# Patient Record
Sex: FEMALE | Race: WHITE | NOT HISPANIC OR LATINO | URBAN - METROPOLITAN AREA
[De-identification: names, ages, dates, MRNs, and addresses within clinical notes are randomized per-mention and may not be internally consistent; named-entity substitution may affect disease eponyms.]

---

## 2020-08-13 ENCOUNTER — OFFICE VISIT (OUTPATIENT)
Dept: URBAN - METROPOLITAN AREA CLINIC 74 | Facility: CLINIC | Age: 50
End: 2020-08-13

## 2020-08-31 ENCOUNTER — LAB OUTSIDE AN ENCOUNTER (OUTPATIENT)
Dept: URBAN - METROPOLITAN AREA CLINIC 74 | Facility: CLINIC | Age: 50
End: 2020-08-31

## 2020-08-31 ENCOUNTER — OFFICE VISIT (OUTPATIENT)
Dept: URBAN - METROPOLITAN AREA CLINIC 74 | Facility: CLINIC | Age: 50
End: 2020-08-31
Payer: COMMERCIAL

## 2020-08-31 DIAGNOSIS — R13.10 ESOPHAGEAL DYSPHAGIA: ICD-10-CM

## 2020-08-31 DIAGNOSIS — Z12.11 ENCOUNTER FOR SCREENING FOR MALIGNANT NEOPLASM OF COLON: ICD-10-CM

## 2020-08-31 DIAGNOSIS — Z12.12 ENCOUNTER FOR SCREENING FOR MALIGNANT NEOPLASM OF RECTUM: ICD-10-CM

## 2020-08-31 DIAGNOSIS — Z91.89 OTHER SPECIFIED PERSONAL RISK FACTORS, NOT ELSEWHERE CLASSIFIED: ICD-10-CM

## 2020-08-31 PROBLEM — 305058001: Status: ACTIVE | Noted: 2020-08-31

## 2020-08-31 PROBLEM — 40890009: Status: ACTIVE | Noted: 2020-08-31

## 2020-08-31 PROBLEM — 281694009: Status: ACTIVE | Noted: 2020-08-31

## 2020-08-31 PROCEDURE — 99203 OFFICE O/P NEW LOW 30 MIN: CPT | Performed by: STUDENT IN AN ORGANIZED HEALTH CARE EDUCATION/TRAINING PROGRAM

## 2020-08-31 PROCEDURE — G8427 DOCREV CUR MEDS BY ELIG CLIN: HCPCS | Performed by: STUDENT IN AN ORGANIZED HEALTH CARE EDUCATION/TRAINING PROGRAM

## 2020-08-31 PROCEDURE — 1036F TOBACCO NON-USER: CPT | Performed by: STUDENT IN AN ORGANIZED HEALTH CARE EDUCATION/TRAINING PROGRAM

## 2020-08-31 PROCEDURE — G8417 CALC BMI ABV UP PARAM F/U: HCPCS | Performed by: STUDENT IN AN ORGANIZED HEALTH CARE EDUCATION/TRAINING PROGRAM

## 2020-08-31 RX ORDER — CALCIUM CARBONATE 500 MG/1
1 TABLET TABLET ORAL ONCE A DAY
Status: ACTIVE | COMMUNITY

## 2020-08-31 RX ORDER — FOLIC ACID/MULTIVIT,IRON,MINER .4-18-35
AS DIRECTED TABLET,CHEWABLE ORAL
Status: ACTIVE | COMMUNITY

## 2020-08-31 RX ORDER — FLUTICASONE PROPIONATE AND SALMETEROL 250; 50 UG/1; UG/1
1 PUFF POWDER RESPIRATORY (INHALATION) TWICE A DAY
Status: ACTIVE | COMMUNITY

## 2020-08-31 RX ORDER — ASCORBIC ACID/VITAMIN E/BIOTIN 7.5MG-125
AS DIRECTED TABLET,CHEWABLE ORAL
Status: ACTIVE | COMMUNITY

## 2020-08-31 RX ORDER — SODIUM, POTASSIUM,MAG SULFATES 17.5-3.13G
354ML SOLUTION, RECONSTITUTED, ORAL ORAL
Qty: 354 MILLILITER | Refills: 0 | OUTPATIENT
Start: 2020-08-31

## 2020-08-31 NOTE — HPI-TODAY'S VISIT:
50-year-old female Past medical and surgical history reviewed. Past medical history notable for asthma which is well controlled. Patient comes in for evaluation of worsening heartburn with intermittent dysphagia and odynophagia over last few months. Patient says that her heartburn has always been an issue but used to respond very well to Tums but never did responded to PPIs.  But in the last few months it has been much more frequent with lack of response even to Tums.  The symptoms happen every day and even at night after her meals.  In addition patient has been experiencing feeling of food getting stuck in the middle of her food pipe, mostly with meats and breads, improves with intake of liquids.  Never regurgitates.  Does feel discomfort or pain at the site when the episode happens.  Self resolves in 15 to 20 minutes.  This has been happening about 1-2 times every week in the last few months.  After patient overall the frequency and intensity of the symptoms has worsened with. Patient denies any abdominal pain. Denies any constipation or diarrhea.  No blood in the stool. Denies any NSAID use. Not on any anticoagulants.  No aspirin use. Significant family history of colorectal cancer stage IV diagnosed in brother, first-degree relative at age 53 years. No prior endoscopy or colonoscopy ever No weight loss.

## 2020-09-10 ENCOUNTER — TELEPHONE ENCOUNTER (OUTPATIENT)
Dept: URBAN - METROPOLITAN AREA CLINIC 74 | Facility: CLINIC | Age: 50
End: 2020-09-10

## 2020-09-10 ENCOUNTER — OFFICE VISIT (OUTPATIENT)
Dept: URBAN - METROPOLITAN AREA SURGERY CENTER 30 | Facility: SURGERY CENTER | Age: 50
End: 2020-09-10
Payer: COMMERCIAL

## 2020-09-10 DIAGNOSIS — K22.2 ESOPHAGEAL OBSTRUCTION: ICD-10-CM

## 2020-09-10 DIAGNOSIS — K21.0 BILE REFLUX ESOPHAGITIS: ICD-10-CM

## 2020-09-10 PROCEDURE — G8907 PT DOC NO EVENTS ON DISCHARG: HCPCS | Performed by: STUDENT IN AN ORGANIZED HEALTH CARE EDUCATION/TRAINING PROGRAM

## 2020-09-10 PROCEDURE — 43235 EGD DIAGNOSTIC BRUSH WASH: CPT | Performed by: STUDENT IN AN ORGANIZED HEALTH CARE EDUCATION/TRAINING PROGRAM

## 2020-09-10 RX ORDER — ASCORBIC ACID/VITAMIN E/BIOTIN 7.5MG-125
AS DIRECTED TABLET,CHEWABLE ORAL
Status: ACTIVE | COMMUNITY

## 2020-09-10 RX ORDER — CALCIUM CARBONATE 500 MG/1
1 TABLET TABLET ORAL ONCE A DAY
Status: ACTIVE | COMMUNITY

## 2020-09-10 RX ORDER — FLUTICASONE PROPIONATE AND SALMETEROL 250; 50 UG/1; UG/1
1 PUFF POWDER RESPIRATORY (INHALATION) TWICE A DAY
Status: ACTIVE | COMMUNITY

## 2020-09-10 RX ORDER — FOLIC ACID/MULTIVIT,IRON,MINER .4-18-35
AS DIRECTED TABLET,CHEWABLE ORAL
Status: ACTIVE | COMMUNITY

## 2020-09-10 RX ORDER — SODIUM, POTASSIUM,MAG SULFATES 17.5-3.13G
354ML SOLUTION, RECONSTITUTED, ORAL ORAL
Qty: 354 MILLILITER | Refills: 0 | Status: ACTIVE | COMMUNITY
Start: 2020-08-31

## 2020-09-10 RX ORDER — PANTOPRAZOLE SODIUM 40 MG/1
1 TABLET TABLET, DELAYED RELEASE ORAL BID
Qty: 60 | Refills: 2 | OUTPATIENT
Start: 2020-09-10

## 2020-09-17 ENCOUNTER — OFFICE VISIT (OUTPATIENT)
Dept: URBAN - METROPOLITAN AREA SURGERY CENTER 30 | Facility: SURGERY CENTER | Age: 50
End: 2020-09-17
Payer: COMMERCIAL

## 2020-09-17 DIAGNOSIS — Z80.0 FAMILY HISTORY MALIGNANT NEOPLASM OF BILIARY TRACT: ICD-10-CM

## 2020-09-17 DIAGNOSIS — Z12.11 COLON CANCER SCREENING: ICD-10-CM

## 2020-09-17 DIAGNOSIS — K63.89 BACTERIAL OVERGROWTH SYNDROME: ICD-10-CM

## 2020-09-17 DIAGNOSIS — D12.2 ADENOMA OF ASCENDING COLON: ICD-10-CM

## 2020-09-17 PROCEDURE — 45385 COLONOSCOPY W/LESION REMOVAL: CPT | Performed by: STUDENT IN AN ORGANIZED HEALTH CARE EDUCATION/TRAINING PROGRAM

## 2020-09-17 PROCEDURE — G8907 PT DOC NO EVENTS ON DISCHARG: HCPCS | Performed by: STUDENT IN AN ORGANIZED HEALTH CARE EDUCATION/TRAINING PROGRAM

## 2020-09-17 PROCEDURE — G9933 CANC DETECTD DURING COL SCRN: HCPCS | Performed by: STUDENT IN AN ORGANIZED HEALTH CARE EDUCATION/TRAINING PROGRAM

## 2020-09-28 ENCOUNTER — OFFICE VISIT (OUTPATIENT)
Dept: URBAN - METROPOLITAN AREA CLINIC 74 | Facility: CLINIC | Age: 50
End: 2020-09-28

## 2020-09-29 ENCOUNTER — OFFICE VISIT (OUTPATIENT)
Dept: URBAN - METROPOLITAN AREA CLINIC 74 | Facility: CLINIC | Age: 50
End: 2020-09-29
Payer: COMMERCIAL

## 2020-09-29 DIAGNOSIS — D12.6 TUBULAR ADENOMA OF COLON: ICD-10-CM

## 2020-09-29 DIAGNOSIS — K20.8 LOS ANGELES GRADE C ESOPHAGITIS: ICD-10-CM

## 2020-09-29 PROCEDURE — G8417 CALC BMI ABV UP PARAM F/U: HCPCS | Performed by: STUDENT IN AN ORGANIZED HEALTH CARE EDUCATION/TRAINING PROGRAM

## 2020-09-29 PROCEDURE — 99213 OFFICE O/P EST LOW 20 MIN: CPT | Performed by: STUDENT IN AN ORGANIZED HEALTH CARE EDUCATION/TRAINING PROGRAM

## 2020-09-29 PROCEDURE — G8427 DOCREV CUR MEDS BY ELIG CLIN: HCPCS | Performed by: STUDENT IN AN ORGANIZED HEALTH CARE EDUCATION/TRAINING PROGRAM

## 2020-09-29 PROCEDURE — 1036F TOBACCO NON-USER: CPT | Performed by: STUDENT IN AN ORGANIZED HEALTH CARE EDUCATION/TRAINING PROGRAM

## 2020-09-29 RX ORDER — PANTOPRAZOLE SODIUM 40 MG/1
1 TABLET TABLET, DELAYED RELEASE ORAL BID
Qty: 60 | Refills: 2 | Status: ACTIVE | COMMUNITY
Start: 2020-09-10

## 2020-09-29 RX ORDER — FOLIC ACID/MULTIVIT,IRON,MINER .4-18-35
AS DIRECTED TABLET,CHEWABLE ORAL
Status: ACTIVE | COMMUNITY

## 2020-09-29 RX ORDER — CALCIUM CARBONATE 500 MG/1
1 TABLET TABLET ORAL ONCE A DAY
Status: ACTIVE | COMMUNITY

## 2020-09-29 RX ORDER — FLUTICASONE PROPIONATE AND SALMETEROL 250; 50 UG/1; UG/1
1 PUFF POWDER RESPIRATORY (INHALATION) TWICE A DAY
Status: ACTIVE | COMMUNITY

## 2020-09-29 RX ORDER — SODIUM, POTASSIUM,MAG SULFATES 17.5-3.13G
354ML SOLUTION, RECONSTITUTED, ORAL ORAL
Qty: 354 MILLILITER | Refills: 0 | Status: DISCONTINUED | COMMUNITY
Start: 2020-08-31

## 2020-09-29 RX ORDER — ASCORBIC ACID/VITAMIN E/BIOTIN 7.5MG-125
AS DIRECTED TABLET,CHEWABLE ORAL
Status: ACTIVE | COMMUNITY

## 2020-09-29 NOTE — HPI-TODAY'S VISIT:
50-year-old female Past medical and surgical history reviewed. Patient comes in for a follow-up visit after her procedures. EGD was notable for grade C esophagitis.  Mild narrowing with Schatzki's ring at GE junction noted.  Not dilated.  Small hiatal hernia. Colonoscopy was notable for one 7 mm polyp status post cold snare resection.  Pathology notable for tubular adenoma.  Otherwise colon diverticulosis noted. Postprocedure patient was started on pantoprazole 40 mg twice daily.  Patient has been compliant with it. Patient today denies any GI specific complaints.  No dysphagia.  No heartburn episodes.  No nausea or vomiting.  No regurgitation.  No abdominal pain.  No change in bowel habits.  No diarrhea or constipation.  No blood in stool.

## 2020-10-02 ENCOUNTER — OFFICE VISIT (OUTPATIENT)
Dept: URBAN - METROPOLITAN AREA CLINIC 74 | Facility: CLINIC | Age: 50
End: 2020-10-02

## 2020-11-19 ENCOUNTER — OFFICE VISIT (OUTPATIENT)
Dept: URBAN - METROPOLITAN AREA SURGERY CENTER 30 | Facility: SURGERY CENTER | Age: 50
End: 2020-11-19
Payer: COMMERCIAL

## 2020-11-19 DIAGNOSIS — K22.8 COLUMNAR-LINED ESOPHAGUS: ICD-10-CM

## 2020-11-19 DIAGNOSIS — R13.10 ABNORMAL SWALLOWING: ICD-10-CM

## 2020-11-19 PROCEDURE — G8907 PT DOC NO EVENTS ON DISCHARG: HCPCS | Performed by: STUDENT IN AN ORGANIZED HEALTH CARE EDUCATION/TRAINING PROGRAM

## 2020-11-19 PROCEDURE — 43235 EGD DIAGNOSTIC BRUSH WASH: CPT | Performed by: STUDENT IN AN ORGANIZED HEALTH CARE EDUCATION/TRAINING PROGRAM

## 2020-12-04 ENCOUNTER — OFFICE VISIT (OUTPATIENT)
Dept: URBAN - METROPOLITAN AREA CLINIC 74 | Facility: CLINIC | Age: 50
End: 2020-12-04
Payer: COMMERCIAL

## 2020-12-04 ENCOUNTER — WEB ENCOUNTER (OUTPATIENT)
Dept: URBAN - METROPOLITAN AREA CLINIC 74 | Facility: CLINIC | Age: 50
End: 2020-12-04

## 2020-12-04 DIAGNOSIS — K20.80 LOS ANGELES GRADE C ESOPHAGITIS: ICD-10-CM

## 2020-12-04 DIAGNOSIS — D12.6 TUBULAR ADENOMA OF COLON: ICD-10-CM

## 2020-12-04 PROCEDURE — G9903 PT SCRN TBCO ID AS NON USER: HCPCS | Performed by: STUDENT IN AN ORGANIZED HEALTH CARE EDUCATION/TRAINING PROGRAM

## 2020-12-04 PROCEDURE — G8417 CALC BMI ABV UP PARAM F/U: HCPCS | Performed by: STUDENT IN AN ORGANIZED HEALTH CARE EDUCATION/TRAINING PROGRAM

## 2020-12-04 PROCEDURE — 99213 OFFICE O/P EST LOW 20 MIN: CPT | Performed by: STUDENT IN AN ORGANIZED HEALTH CARE EDUCATION/TRAINING PROGRAM

## 2020-12-04 PROCEDURE — 1036F TOBACCO NON-USER: CPT | Performed by: STUDENT IN AN ORGANIZED HEALTH CARE EDUCATION/TRAINING PROGRAM

## 2020-12-04 PROCEDURE — G8484 FLU IMMUNIZE NO ADMIN: HCPCS | Performed by: STUDENT IN AN ORGANIZED HEALTH CARE EDUCATION/TRAINING PROGRAM

## 2020-12-04 PROCEDURE — G8427 DOCREV CUR MEDS BY ELIG CLIN: HCPCS | Performed by: STUDENT IN AN ORGANIZED HEALTH CARE EDUCATION/TRAINING PROGRAM

## 2020-12-04 RX ORDER — FLUTICASONE PROPIONATE AND SALMETEROL 250; 50 UG/1; UG/1
1 PUFF POWDER RESPIRATORY (INHALATION) TWICE A DAY
Status: ACTIVE | COMMUNITY

## 2020-12-04 RX ORDER — ASCORBIC ACID/VITAMIN E/BIOTIN 7.5MG-125
AS DIRECTED TABLET,CHEWABLE ORAL
Status: ACTIVE | COMMUNITY

## 2020-12-04 RX ORDER — FOLIC ACID/MULTIVIT,IRON,MINER .4-18-35
AS DIRECTED TABLET,CHEWABLE ORAL
Status: ACTIVE | COMMUNITY

## 2020-12-04 RX ORDER — CALCIUM CARBONATE 500 MG/1
1 TABLET TABLET ORAL ONCE A DAY
Status: ACTIVE | COMMUNITY

## 2020-12-04 RX ORDER — PANTOPRAZOLE SODIUM 40 MG/1
1 TABLET TABLET, DELAYED RELEASE ORAL BID
Qty: 60 | Refills: 2 | Status: ACTIVE | COMMUNITY
Start: 2020-09-10

## 2020-12-04 NOTE — HPI-TODAY'S VISIT:
50-year-old female Established patient with me Initially underwent EGD and colonoscopy. EGD was notable for grade C esophagitis.  Patient was started on twice daily pantoprazole 40 mg.  Patient had a repeat EGD 3 months later which confirmed complete healing of esophagitis.  No precancerous lesions or Winkler's disease.  Currently patient is on 40 mg pantoprazole every day. Colonoscopy was notable for colon tubular adenoma status post complete removal with cold snare. Patient currently denies any GI specific symptoms.  No dysphagia, odynophagia, heartburn, abdominal pain, nausea, vomiting, weight loss, change in bowel habits, constipation, diarrhea or blood in stool.

## 2021-01-08 ENCOUNTER — ERX REFILL RESPONSE (OUTPATIENT)
Dept: URBAN - METROPOLITAN AREA CLINIC 74 | Facility: CLINIC | Age: 51
End: 2021-01-08

## 2021-01-08 RX ORDER — PANTOPRAZOLE SODIUM 40 MG/1
1 TABLET TABLET, DELAYED RELEASE ORAL BID
Qty: 60 | Refills: 0

## 2021-02-11 ENCOUNTER — ERX REFILL RESPONSE (OUTPATIENT)
Dept: URBAN - METROPOLITAN AREA CLINIC 74 | Facility: CLINIC | Age: 51
End: 2021-02-11

## 2021-02-11 RX ORDER — PANTOPRAZOLE SODIUM 40 MG/1
1 TABLET TABLET, DELAYED RELEASE ORAL BID
Qty: 60 | Refills: 0

## 2021-05-05 ENCOUNTER — TELEPHONE ENCOUNTER (OUTPATIENT)
Dept: URBAN - METROPOLITAN AREA CLINIC 23 | Facility: CLINIC | Age: 51
End: 2021-05-05

## 2021-05-05 RX ORDER — PANTOPRAZOLE SODIUM 40 MG/1
1 TABLET TABLET, DELAYED RELEASE ORAL ONCE A DAY
Qty: 90 TABLET | Refills: 3

## 2021-05-20 ENCOUNTER — ERX REFILL RESPONSE (OUTPATIENT)
Dept: URBAN - METROPOLITAN AREA CLINIC 74 | Facility: CLINIC | Age: 51
End: 2021-05-20

## 2021-05-20 RX ORDER — PANTOPRAZOLE SODIUM 40 MG/1
1 TABLET TABLET, DELAYED RELEASE ORAL BID
Qty: 60 | Refills: 0

## 2021-10-20 NOTE — PHYSICAL EXAM HENT:
Head, normocephalic, atraumatic, Face, Face within normal limits, Ears, External ears within normal limits, Nose/Nasopharynx, External nose  normal appearance, nares patent, no nasal discharge, Mouth and Throat, Oral cavity appearance normal, Breath odor normal, Lips, Appearance normal
[Symptom and Test Evaluation] : symptom and test evaluation

## 2022-04-25 ENCOUNTER — TELEPHONE ENCOUNTER (OUTPATIENT)
Dept: URBAN - METROPOLITAN AREA CLINIC 74 | Facility: CLINIC | Age: 52
End: 2022-04-25

## 2022-04-25 ENCOUNTER — ERX REFILL RESPONSE (OUTPATIENT)
Dept: URBAN - METROPOLITAN AREA CLINIC 40 | Facility: CLINIC | Age: 52
End: 2022-04-25

## 2022-04-25 RX ORDER — PANTOPRAZOLE SODIUM 40 MG/1
TAKE 1 TABLET BY MOUTH EVERY DAY TABLET, DELAYED RELEASE ORAL
Qty: 90 TABLET | Refills: 1 | OUTPATIENT

## 2022-04-25 RX ORDER — PANTOPRAZOLE SODIUM 40 MG/1
TAKE 1 TABLET BY MOUTH EVERY DAY TABLET, DELAYED RELEASE ORAL
Qty: 90 TABLET | Refills: 0 | OUTPATIENT

## 2022-04-25 RX ORDER — PANTOPRAZOLE SODIUM 40 MG/1
1 TABLET TABLET, DELAYED RELEASE ORAL ONCE A DAY
Qty: 90 | Refills: 3

## 2022-08-29 PROBLEM — 196735001: Status: ACTIVE | Noted: 2022-08-29

## 2022-09-01 ENCOUNTER — OFFICE VISIT (OUTPATIENT)
Dept: URBAN - METROPOLITAN AREA CLINIC 74 | Facility: CLINIC | Age: 52
End: 2022-09-01

## 2022-09-01 RX ORDER — CALCIUM CARBONATE 500 MG/1
1 TABLET TABLET ORAL ONCE A DAY
Status: ACTIVE | COMMUNITY

## 2022-09-01 RX ORDER — PANTOPRAZOLE SODIUM 40 MG/1
1 TABLET TABLET, DELAYED RELEASE ORAL ONCE A DAY
Qty: 90 | Refills: 3 | Status: ACTIVE | COMMUNITY

## 2022-09-01 RX ORDER — ASCORBIC ACID/VITAMIN E/BIOTIN 7.5MG-125
AS DIRECTED TABLET,CHEWABLE ORAL
Status: ACTIVE | COMMUNITY

## 2022-09-01 RX ORDER — FOLIC ACID/MULTIVIT,IRON,MINER .4-18-35
AS DIRECTED TABLET,CHEWABLE ORAL
Status: ACTIVE | COMMUNITY

## 2022-09-01 RX ORDER — FLUTICASONE PROPIONATE AND SALMETEROL 250; 50 UG/1; UG/1
1 PUFF POWDER RESPIRATORY (INHALATION) TWICE A DAY
Status: ACTIVE | COMMUNITY

## 2022-09-01 NOTE — HPI-TODAY'S VISIT:
The patient is 52-year-old female well-known to Dr. Bailey and Dr. Gilmore is here today for reflux symptoms.   Procedures: -- EGD with biopsy on 11/19/2020 by Dr. Bailey noted Z-line irregular, at the gastro esophageal junction.  There is no endoscopy evidence of Winkler's esophagus, areas of erosion, esophagitis, mucosal abnormalities, salmon-colored mucosa, stenosis or stricture in the entire esophagus.  Normal stomach.  Normal examined duodenum.  No specimen collected.    -- Colonoscopy with polypectomy on 09/17/2020 by Dr. Bailey noted one 7 mm polyp was found in the proximal ascending colon resected and retrieved.  One  3 mm polyp was found in the recto-sigmoid colon.  Polypectomy was not attempted due to hyperplastic appearance.  Diverticulosis in the ascending colon.  The examination was otherwise normal on direct and retroflexion views.  Biopsy with tubular adenoma colon polyp.

## 2022-09-20 ENCOUNTER — OFFICE VISIT (OUTPATIENT)
Dept: URBAN - METROPOLITAN AREA CLINIC 40 | Facility: CLINIC | Age: 52
End: 2022-09-20

## 2022-09-20 RX ORDER — FOLIC ACID/MULTIVIT,IRON,MINER .4-18-35
AS DIRECTED TABLET,CHEWABLE ORAL
Status: ACTIVE | COMMUNITY

## 2022-09-20 RX ORDER — ASCORBIC ACID/VITAMIN E/BIOTIN 7.5MG-125
AS DIRECTED TABLET,CHEWABLE ORAL
Status: ACTIVE | COMMUNITY

## 2022-09-20 RX ORDER — PANTOPRAZOLE SODIUM 40 MG/1
1 TABLET TABLET, DELAYED RELEASE ORAL ONCE A DAY
Qty: 90 | Refills: 3 | Status: ACTIVE | COMMUNITY

## 2022-09-20 RX ORDER — FLUTICASONE PROPIONATE AND SALMETEROL 250; 50 UG/1; UG/1
1 PUFF POWDER RESPIRATORY (INHALATION) TWICE A DAY
Status: ACTIVE | COMMUNITY

## 2022-09-20 RX ORDER — CALCIUM CARBONATE 500 MG/1
1 TABLET TABLET ORAL ONCE A DAY
Status: ACTIVE | COMMUNITY

## 2022-09-20 NOTE — PHYSICAL EXAM NECK/THYROID:
normal appearance , without tenderness upon palpation , no deformities , trachea midline , Thyroid normal size , no thyroid nodules , no masses , no JVD , thyroid nontender Good Good Good

## 2022-10-20 ENCOUNTER — OFFICE VISIT (OUTPATIENT)
Dept: URBAN - METROPOLITAN AREA CLINIC 40 | Facility: CLINIC | Age: 52
End: 2022-10-20
Payer: COMMERCIAL

## 2022-10-20 VITALS
HEIGHT: 64 IN | BODY MASS INDEX: 34.15 KG/M2 | TEMPERATURE: 98.1 F | HEART RATE: 67 BPM | SYSTOLIC BLOOD PRESSURE: 128 MMHG | WEIGHT: 200 LBS | DIASTOLIC BLOOD PRESSURE: 90 MMHG

## 2022-10-20 DIAGNOSIS — D12.6 TUBULAR ADENOMA OF COLON: ICD-10-CM

## 2022-10-20 DIAGNOSIS — K29.70 GASTRITIS: ICD-10-CM

## 2022-10-20 DIAGNOSIS — K20.8 LOS ANGELES GRADE C ESOPHAGITIS: ICD-10-CM

## 2022-10-20 PROBLEM — 4556007 GASTRITIS: Status: ACTIVE | Noted: 2022-09-20

## 2022-10-20 PROBLEM — 16761005: Status: ACTIVE | Noted: 2020-09-10

## 2022-10-20 PROBLEM — 444898006: Status: ACTIVE | Noted: 2020-09-29

## 2022-10-20 PROCEDURE — 99213 OFFICE O/P EST LOW 20 MIN: CPT | Performed by: PHYSICIAN ASSISTANT

## 2022-10-20 RX ORDER — CALCIUM CARBONATE 500 MG/1
1 TABLET TABLET ORAL ONCE A DAY
Status: ACTIVE | COMMUNITY

## 2022-10-20 RX ORDER — FOLIC ACID/MULTIVIT,IRON,MINER .4-18-35
AS DIRECTED TABLET,CHEWABLE ORAL
Status: ACTIVE | COMMUNITY

## 2022-10-20 RX ORDER — ASCORBIC ACID/VITAMIN E/BIOTIN 7.5MG-125
AS DIRECTED TABLET,CHEWABLE ORAL
Status: ACTIVE | COMMUNITY

## 2022-10-20 RX ORDER — PANTOPRAZOLE SODIUM 40 MG/1
1 TABLET TABLET, DELAYED RELEASE ORAL ONCE A DAY
Qty: 90 | Refills: 3 | Status: ACTIVE | COMMUNITY

## 2022-10-20 RX ORDER — PANTOPRAZOLE SODIUM 40 MG/1
1 TABLET TABLET, DELAYED RELEASE ORAL ONCE A DAY
Qty: 90 | Refills: 3

## 2022-10-20 RX ORDER — FLUTICASONE PROPIONATE AND SALMETEROL 250; 50 UG/1; UG/1
1 PUFF POWDER RESPIRATORY (INHALATION) TWICE A DAY
Status: ACTIVE | COMMUNITY

## 2022-10-20 NOTE — HPI-TODAY'S VISIT:
Ms. Mercedes is a 52-year-old White female well-known to Dr. Bailey and Dr. Gilmore is here today for reflux symptoms. Last f/u 12/4/20.   Procedures: -- EGD with biopsy on 11/19/2020 by Dr. Bailey noted Z-line irregular, at the gastro esophageal junction.  There is no endoscopy evidence of Winkler's esophagus, areas of erosion, esophagitis, mucosal abnormalities, salmon-colored mucosa, stenosis or stricture in the entire esophagus.  Normal stomach.  Normal examined duodenum.  No specimen collected.    -- Colonoscopy with polypectomy on 09/17/2020 by Dr. Bailey noted one 7 mm polyp was found in the proximal ascending colon resected and retrieved.  One  3 mm polyp was found in the recto-sigmoid colon.  Polypectomy was not attempted due to hyperplastic appearance.  Diverticulosis in the ascending colon.  The examination was otherwise normal on direct and retroflexion views.  Biopsy with tubular adenoma colon polyp. She returns to the office requesting refill of pantoprazole. Down from two a day to one a day. Denies new GI complaints. She is monitoring diet, trying to exercise some for weight loss. No regular alcohol consumption. Nonsmoker with history of asthma. Admits recent use of Aleve in the last 2-3 days for LE, foot pain. Uses meloxicam rarely. No GI bleeding reported.

## 2023-06-08 ENCOUNTER — TELEPHONE ENCOUNTER (OUTPATIENT)
Dept: URBAN - METROPOLITAN AREA CLINIC 40 | Facility: CLINIC | Age: 53
End: 2023-06-08

## 2023-06-08 RX ORDER — PANTOPRAZOLE SODIUM 40 MG/1
1 TABLET TABLET, DELAYED RELEASE ORAL ONCE A DAY
Qty: 90 | Refills: 0

## 2023-10-20 ENCOUNTER — OFFICE VISIT (OUTPATIENT)
Dept: URBAN - METROPOLITAN AREA CLINIC 40 | Facility: CLINIC | Age: 53
End: 2023-10-20

## 2023-10-20 RX ORDER — CALCIUM CARBONATE 500 MG/1
1 TABLET TABLET ORAL ONCE A DAY
Status: ACTIVE | COMMUNITY

## 2023-10-20 RX ORDER — ASCORBIC ACID/VITAMIN E/BIOTIN 7.5MG-125
AS DIRECTED TABLET,CHEWABLE ORAL
Status: ACTIVE | COMMUNITY

## 2023-10-20 RX ORDER — FOLIC ACID/MULTIVIT,IRON,MINER .4-18-35
AS DIRECTED TABLET,CHEWABLE ORAL
Status: ACTIVE | COMMUNITY

## 2023-10-20 RX ORDER — FLUTICASONE PROPIONATE AND SALMETEROL 250; 50 UG/1; UG/1
1 PUFF POWDER RESPIRATORY (INHALATION) TWICE A DAY
Status: ACTIVE | COMMUNITY

## 2023-10-20 RX ORDER — PANTOPRAZOLE SODIUM 40 MG/1
1 TABLET TABLET, DELAYED RELEASE ORAL ONCE A DAY
Qty: 90 | Refills: 0 | Status: ACTIVE | COMMUNITY

## 2023-10-31 ENCOUNTER — DASHBOARD ENCOUNTERS (OUTPATIENT)
Age: 53
End: 2023-10-31

## 2023-10-31 ENCOUNTER — OFFICE VISIT (OUTPATIENT)
Dept: URBAN - METROPOLITAN AREA CLINIC 40 | Facility: CLINIC | Age: 53
End: 2023-10-31
Payer: COMMERCIAL

## 2023-10-31 VITALS
WEIGHT: 126 LBS | BODY MASS INDEX: 21.51 KG/M2 | SYSTOLIC BLOOD PRESSURE: 110 MMHG | DIASTOLIC BLOOD PRESSURE: 64 MMHG | HEIGHT: 64 IN | TEMPERATURE: 96.1 F | HEART RATE: 68 BPM

## 2023-10-31 DIAGNOSIS — D12.6 TUBULAR ADENOMA OF COLON: ICD-10-CM

## 2023-10-31 DIAGNOSIS — K29.70 GASTRITIS: ICD-10-CM

## 2023-10-31 DIAGNOSIS — Z87.19 HISTORY OF ESOPHAGITIS: ICD-10-CM

## 2023-10-31 PROCEDURE — 99214 OFFICE O/P EST MOD 30 MIN: CPT | Performed by: PHYSICIAN ASSISTANT

## 2023-10-31 RX ORDER — FOLIC ACID/MULTIVIT,IRON,MINER .4-18-35
AS DIRECTED TABLET,CHEWABLE ORAL
Status: ACTIVE | COMMUNITY

## 2023-10-31 RX ORDER — PANTOPRAZOLE SODIUM 40 MG/1
1 TABLET TABLET, DELAYED RELEASE ORAL ONCE A DAY
Qty: 90 | Refills: 0 | Status: ACTIVE | COMMUNITY

## 2023-10-31 RX ORDER — ASCORBIC ACID/VITAMIN E/BIOTIN 7.5MG-125
AS DIRECTED TABLET,CHEWABLE ORAL
Status: ACTIVE | COMMUNITY

## 2023-10-31 RX ORDER — PANTOPRAZOLE SODIUM 40 MG/1
1 TABLET TABLET, DELAYED RELEASE ORAL ONCE A DAY
Qty: 90 TABLET | Refills: 3

## 2023-10-31 NOTE — HPI-TODAY'S VISIT:
Ms. Mercedes is a 52-year-old White female well-known to Dr. Bailey and Dr. Gilmore is here today for reflux symptoms. Last f/u 12/4/20.   Procedures: -- EGD with biopsy on 11/19/2020 by Dr. Bailey noted Z-line irregular, at the gastro esophageal junction.  There is no endoscopy evidence of Winkler's esophagus, areas of erosion, esophagitis, mucosal abnormalities, salmon-colored mucosa, stenosis or stricture in the entire esophagus.  Normal stomach.  Normal examined duodenum.  No specimen collected.    -- Colonoscopy with polypectomy on 09/17/2020 by Dr. Bailey noted one 7 mm polyp was found in the proximal ascending colon resected and retrieved.  One  3 mm polyp was found in the recto-sigmoid colon.  Polypectomy was not attempted due to hyperplastic appearance.  Diverticulosis in the ascending colon.  The examination was otherwise normal on direct and retroflexion views.  Biopsy with tubular adenoma colon polyp. She returns to the office requesting refill of pantoprazole. Down from two a day to one a day. Denies new GI complaints. She is monitoring diet, trying to exercise some for weight loss. No regular alcohol consumption. Nonsmoker with history of asthma. Admits recent use of Aleve in the last 2-3 days for LE, foot pain. Uses meloxicam rarely. No GI bleeding reported. Down 70 pounds since last visit in 2022, intentional.

## 2024-10-30 ENCOUNTER — OFFICE VISIT (OUTPATIENT)
Dept: URBAN - METROPOLITAN AREA CLINIC 40 | Facility: CLINIC | Age: 54
End: 2024-10-30
Payer: COMMERCIAL

## 2024-10-30 ENCOUNTER — LAB OUTSIDE AN ENCOUNTER (OUTPATIENT)
Dept: URBAN - METROPOLITAN AREA CLINIC 40 | Facility: CLINIC | Age: 54
End: 2024-10-30

## 2024-10-30 VITALS
WEIGHT: 146 LBS | SYSTOLIC BLOOD PRESSURE: 120 MMHG | BODY MASS INDEX: 24.92 KG/M2 | HEIGHT: 64 IN | DIASTOLIC BLOOD PRESSURE: 78 MMHG | HEART RATE: 70 BPM | TEMPERATURE: 97.7 F

## 2024-10-30 DIAGNOSIS — Z87.19 HISTORY OF ESOPHAGITIS: ICD-10-CM

## 2024-10-30 DIAGNOSIS — K92.1 RECTAL BLEEDING: ICD-10-CM

## 2024-10-30 DIAGNOSIS — K29.70 GASTRITIS: ICD-10-CM

## 2024-10-30 DIAGNOSIS — Z80.0 FAMILY HISTORY OF COLON CANCER: ICD-10-CM

## 2024-10-30 PROCEDURE — 99213 OFFICE O/P EST LOW 20 MIN: CPT | Performed by: PHYSICIAN ASSISTANT

## 2024-10-30 RX ORDER — ASCORBIC ACID/VITAMIN E/BIOTIN 7.5MG-125
AS DIRECTED TABLET,CHEWABLE ORAL
Status: ACTIVE | COMMUNITY

## 2024-10-30 RX ORDER — PANTOPRAZOLE SODIUM 40 MG/1
1 TABLET TABLET, DELAYED RELEASE ORAL ONCE A DAY
Qty: 90 TABLET | Refills: 3 | Status: ACTIVE | COMMUNITY

## 2024-10-30 RX ORDER — FOLIC ACID/MULTIVIT,IRON,MINER .4-18-35
AS DIRECTED TABLET,CHEWABLE ORAL
Status: ACTIVE | COMMUNITY

## 2024-10-30 NOTE — HPI-TODAY'S VISIT:
Ms. Mercedes is a 54-year-old White female well-known to Dr. Bailey and Dr. Gilmore is here today for reflux symptoms. Last f/u 10/31/23.   Procedures: -- EGD with biopsy on 11/19/2020 by Dr. Bailey noted Z-line irregular, at the gastro esophageal junction.  There is no endoscopy evidence of Winkler's esophagus, areas of erosion, esophagitis, mucosal abnormalities, salmon-colored mucosa, stenosis or stricture in the entire esophagus.  Normal stomach.  Normal examined duodenum.  No specimen collected.    -- Colonoscopy with polypectomy on 09/17/2020 by Dr. Bailey noted one 7 mm polyp was found in the proximal ascending colon resected and retrieved.  One  3 mm polyp was found in the recto-sigmoid colon.  Polypectomy was not attempted due to hyperplastic appearance.  Diverticulosis in the ascending colon.  The examination was otherwise normal on direct and retroflexion views.  Biopsy with tubular adenoma colon polyp. A 5-year surveillance was recommended.  Patient returns to the office today stating her reflux is much better controlled with monitoring her diet, not requiring as much PPI therapy.  Good appetite weight stable.  Rare episodes of epigastric pain.  No significant lower abdominal pain at this time but states that her stools have been more soft.  Recently on muscle relaxers and meloxicam and does report several episodes of painless rectal bleed but states that the blood was "mixed in with the stool".  She has been concerned as her brother passed away 2 years ago from colorectal cancer.  Patient denies strenuous activity, constipation or straining.  No nausea, vomiting, fever or chills.  Denies use of any other nonprescribed medications or blood thinners.  Patient has had no recurrent bleeding in almost 3 weeks.

## 2024-11-04 ENCOUNTER — TELEPHONE ENCOUNTER (OUTPATIENT)
Dept: URBAN - METROPOLITAN AREA CLINIC 40 | Facility: CLINIC | Age: 54
End: 2024-11-04

## 2024-11-20 ENCOUNTER — OFFICE VISIT (OUTPATIENT)
Dept: URBAN - METROPOLITAN AREA SURGERY CENTER 30 | Facility: SURGERY CENTER | Age: 54
End: 2024-11-20

## 2024-12-16 ENCOUNTER — OFFICE VISIT (OUTPATIENT)
Dept: URBAN - METROPOLITAN AREA CLINIC 40 | Facility: CLINIC | Age: 54
End: 2024-12-16

## 2024-12-16 RX ORDER — FOLIC ACID/MULTIVIT,IRON,MINER .4-18-35
AS DIRECTED TABLET,CHEWABLE ORAL
Status: ACTIVE | COMMUNITY

## 2024-12-16 RX ORDER — ASCORBIC ACID/VITAMIN E/BIOTIN 7.5MG-125
AS DIRECTED TABLET,CHEWABLE ORAL
Status: ACTIVE | COMMUNITY

## 2024-12-16 RX ORDER — PANTOPRAZOLE SODIUM 40 MG/1
1 TABLET TABLET, DELAYED RELEASE ORAL ONCE A DAY
Qty: 90 TABLET | Refills: 3 | Status: ACTIVE | COMMUNITY

## 2024-12-16 NOTE — HPI-TODAY'S VISIT:
Ms. Mercedes is a 55-year-old White female well-known to Dr. Bailey and Dr. Gilmore is here today for reflux symptoms. Last f/u 10/31/23.   Procedures: -- EGD with biopsy on 11/19/2020 by Dr. Bailey noted Z-line irregular, at the gastro esophageal junction.  There is no endoscopy evidence of Winkler's esophagus, areas of erosion, esophagitis, mucosal abnormalities, salmon-colored mucosa, stenosis or stricture in the entire esophagus.  Normal stomach.  Normal examined duodenum.  No specimen collected.    -- Colonoscopy with polypectomy on 09/17/2020 by Dr. Bailey noted one 7 mm polyp was found in the proximal ascending colon resected and retrieved.  One  3 mm polyp was found in the recto-sigmoid colon.  Polypectomy was not attempted due to hyperplastic appearance.  Diverticulosis in the ascending colon.  The examination was otherwise normal on direct and retroflexion views.  Biopsy with tubular adenoma colon polyp. A 5-year surveillance was recommended.  Patient last seen with report of several episodes of painless rectal bleed but states that the blood was "mixed in with the stool".  She has been concerned as her brother passed away 2 years ago from colorectal cancer.  Patient denies strenuous activity, constipation or straining.  No nausea, vomiting, fever or chills.  Denies use of any other nonprescribed medications or blood thinners. A colonoscopy scheduled.

## 2024-12-30 ENCOUNTER — ERX REFILL RESPONSE (OUTPATIENT)
Dept: URBAN - METROPOLITAN AREA CLINIC 40 | Facility: CLINIC | Age: 54
End: 2024-12-30

## 2024-12-30 RX ORDER — PANTOPRAZOLE SODIUM 40 MG/1
TAKE 1 TABLET BY MOUTH EVERY DAY TABLET, DELAYED RELEASE ORAL
Qty: 90 TABLET | Refills: 0 | OUTPATIENT

## 2024-12-30 RX ORDER — PANTOPRAZOLE SODIUM 40 MG/1
1 TABLET TABLET, DELAYED RELEASE ORAL ONCE A DAY
Qty: 90 TABLET | Refills: 3 | OUTPATIENT

## 2025-04-16 ENCOUNTER — ERX REFILL RESPONSE (OUTPATIENT)
Dept: URBAN - METROPOLITAN AREA CLINIC 40 | Facility: CLINIC | Age: 55
End: 2025-04-16

## 2025-04-16 RX ORDER — PANTOPRAZOLE SODIUM 40 MG/1
TAKE 1 TABLET BY MOUTH EVERY DAY TABLET, DELAYED RELEASE ORAL
Qty: 90 TABLET | Refills: 0 | OUTPATIENT